# Patient Record
Sex: FEMALE | Race: WHITE | Employment: OTHER | URBAN - METROPOLITAN AREA
[De-identification: names, ages, dates, MRNs, and addresses within clinical notes are randomized per-mention and may not be internally consistent; named-entity substitution may affect disease eponyms.]

---

## 2019-10-30 ENCOUNTER — THERAPY VISIT (OUTPATIENT)
Dept: PHYSICAL THERAPY | Facility: CLINIC | Age: 79
End: 2019-10-30
Payer: COMMERCIAL

## 2019-10-30 DIAGNOSIS — M54.2 NECK PAIN: ICD-10-CM

## 2019-10-30 PROCEDURE — 97161 PT EVAL LOW COMPLEX 20 MIN: CPT | Mod: GP | Performed by: PHYSICAL THERAPIST

## 2019-10-30 PROCEDURE — 97110 THERAPEUTIC EXERCISES: CPT | Mod: GP | Performed by: PHYSICAL THERAPIST

## 2019-10-30 PROCEDURE — 97140 MANUAL THERAPY 1/> REGIONS: CPT | Mod: GP | Performed by: PHYSICAL THERAPIST

## 2019-10-30 NOTE — PROGRESS NOTES
[facility] Initial Evaluation  Subjective:  The history is provided by the patient. No  was used.   Type of problem:  Cervical spine   Condition occurred with:  Insidious onset. This is a chronic condition   Problem details: On or about 7/1/2019, I started to have neck pain.  When I saw the MD she took xray and stated I have degenerative disc disease. .   Patient reports pain:  Cervical left side, cervical right side, lower cervical spine and mid cervical spine (right side more than left). Radiates to: none. Associated symptoms:  Headache (every monring wake up.  chews gum, cataract remove.). Symptoms are exacerbated by looking up or down and rotating head (reading ) and relieved by heat (medication RA, tylenol).    Libra Courtney being seen for neck pain.   Problem began 10/4/2019. Where condition occurred: for unknown reasons.Problem occurred: degenerative disc disease  and reported as 2/10 (goes up to 4/10, morning 5-6/10) on pain scale. General health as reported by patient is fair. Pertinent medical history includes:  Rheumatoid arthritis, implanted device and osteoarthritis. Other medical history details: narcolepsy, left TKA, hypoactive thyroid.   Other medical allergies details: none.   Other surgery history details: Left TKA.  Current medications:  Anti-inflammatory, pain medication and thyroid medication. Other medications details: methylphenidate, levothyroxine.   Primary job tasks include:  Computer work and prolonged sitting.  Pain is described as aching and is constant. Pain is worse in the A.M.. Since onset symptoms are gradually worsening. Special tests:  X-ray (degenerative disc disease). Previous treatment includes physical therapy (left TKA).    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  fracture his back and had to work his brace on , walking a dog.  Red flags:   None as reported by patient.                      Objective:  Standing Alignment:    Cervical/Thoracic:  Forward head and thoracic kyphosis increased  Shoulder/UE:  Rounded shoulders, scapular abduction L and scapular abduction R  Lumbar:  Lordosis decr          General deviations alignment: wide base of support.      Flexibility/Screens:   Positive screens:  Cervical and Thoracic  Upper Extremity:    Decreased left upper extremity flexibility at:  Pectoralis Major; Pectoralis Minor and Latissimus    Decreased right upper extremity flexibility present at:  Pectoralis Major; Pectoralis Minor and Latissimus    Spine:  Decreased left spine flexibility:  Scalenes; Upper Trap and Levator    Decreased right spine flexibility:  Scalenes; Upper Trap and Levator                  Cervical/Thoracic Evaluation    AROM:  AROM Cervical:    Flexion:          Moderate movement  Extension:       Minimal movement  Rotation:         Left: minimal movement     Right: minimal movement  Side Bend:      Left:     Right:   AROM Thoracic:    Flexion:              Extension:           Rotation:            Left: minimal movement     Right: minimal movement    Strength: shoulder flexion, abduction WFL   Headaches: cervical  Cervical Myotomes:    C1-2 (Neck Flex): Left:  5    Right: 5  C3 (neck side bend): Left: 5    Right: 5  C4 (shrug):  Left: 5    Right: 5  C5 (Deltoid):  Left: 5-    Right: 5-  C6 (Biceps):  Left: 5    Right: 5  C7 (Triceps):  Left: 5    Right: 5  C8 (Thumb Ext): Left: 5    Right: 5  T1 (Intrinsics): Left: 5    Right: 5        Cervical Palpation:    Tenderness present at Left:    Scalenes; Upper Trap; Levator; Facet and Suboccipitals  Tenderness present at Right:    Scalenes; Upper Trap; Levator; Facet and Suboccipitals    Cervical Stability/Joint Clearing:    Left positive at:1st Rib  Right positive at:  1st Rib  Spinal Segmental Conclusions:    Level:  Hypo at T1, T2, T3, T4 and T5                                                 General     ROS    Assessment/Plan:    Patient is a 79 year old female with cervical complaints.    Patient has the following significant findings with corresponding treatment plan.                Diagnosis 1:  Cervical pain   Pain -  manual therapy, self management, education, directional preference exercise and home program  Decreased ROM/flexibility - manual therapy, therapeutic exercise, therapeutic activity and home program  Decreased joint mobility - manual therapy, therapeutic exercise, therapeutic activity and home program  Decreased strength - therapeutic exercise, therapeutic activities and home program  Impaired muscle performance - neuro re-education and home program  Decreased function - therapeutic activities and home program  Impaired posture - neuro re-education, therapeutic activities and home program    Therapy Evaluation Codes:   Cumulative Therapy Evaluation is: Low complexity.    Previous and current functional limitations:  (See Goal Flow Sheet for this information)    Short term and Long term goals: (See Goal Flow Sheet for this information)     Communication ability:  Patient appears to be able to clearly communicate and understand verbal and written communication and follow directions correctly.  Treatment Explanation - The following has been discussed with the patient:   RX ordered/plan of care  This patient would benefit from PT intervention to resume normal activities.   Rehab potential is good.    Frequency:  1 X week, once daily  Duration:  for 6 weeks  Discharge Plan:  Achieve all LTG.  Independent in home treatment program.    Please refer to the daily flowsheet for treatment today, total treatment time and time spent performing 1:1 timed codes.

## 2019-11-06 ENCOUNTER — THERAPY VISIT (OUTPATIENT)
Dept: PHYSICAL THERAPY | Facility: CLINIC | Age: 79
End: 2019-11-06
Payer: COMMERCIAL

## 2019-11-06 DIAGNOSIS — M54.2 NECK PAIN: ICD-10-CM

## 2019-11-06 PROCEDURE — 97140 MANUAL THERAPY 1/> REGIONS: CPT | Mod: GP | Performed by: PHYSICAL THERAPIST

## 2019-11-06 PROCEDURE — 97110 THERAPEUTIC EXERCISES: CPT | Mod: GP | Performed by: PHYSICAL THERAPIST

## 2019-11-06 PROCEDURE — 97112 NEUROMUSCULAR REEDUCATION: CPT | Mod: GP | Performed by: PHYSICAL THERAPIST

## 2019-11-13 ENCOUNTER — THERAPY VISIT (OUTPATIENT)
Dept: PHYSICAL THERAPY | Facility: CLINIC | Age: 79
End: 2019-11-13
Payer: COMMERCIAL

## 2019-11-13 DIAGNOSIS — M54.2 NECK PAIN: ICD-10-CM

## 2019-11-13 PROCEDURE — 97110 THERAPEUTIC EXERCISES: CPT | Mod: GP | Performed by: PHYSICAL THERAPIST

## 2019-11-13 PROCEDURE — 97140 MANUAL THERAPY 1/> REGIONS: CPT | Mod: GP | Performed by: PHYSICAL THERAPIST

## 2019-11-13 PROCEDURE — 97112 NEUROMUSCULAR REEDUCATION: CPT | Mod: GP | Performed by: PHYSICAL THERAPIST

## 2019-11-20 ENCOUNTER — THERAPY VISIT (OUTPATIENT)
Dept: PHYSICAL THERAPY | Facility: CLINIC | Age: 79
End: 2019-11-20
Payer: COMMERCIAL

## 2019-11-20 DIAGNOSIS — M54.2 NECK PAIN: ICD-10-CM

## 2019-11-20 PROCEDURE — 97140 MANUAL THERAPY 1/> REGIONS: CPT | Mod: GP | Performed by: PHYSICAL THERAPIST

## 2019-11-20 PROCEDURE — 97112 NEUROMUSCULAR REEDUCATION: CPT | Mod: GP | Performed by: PHYSICAL THERAPIST

## 2019-11-20 PROCEDURE — 97110 THERAPEUTIC EXERCISES: CPT | Mod: GP | Performed by: PHYSICAL THERAPIST

## 2019-11-20 NOTE — LETTER
[facility] PHYSICAL THERAPY  [address]  [place] [address]  [phone]    2021    Re: Libra Courtney   :   1940  MRN:  [id number]   REFERRING PHYSICIAN:   Fiona Hedrick    [facility] PHYSICAL THERAPY    Date of Initial Evaluation:  10/23/19  Visits:  Rxs Used: 4  Reason for Referral:  Neck pain    EVALUATION SUMMARY    Discharge Note  Progress reporting period is from initial evaluation date (please see noted date below) to 2019.  Linked Episodes   Type: Episode: Status: Noted: Resolved: Last update: Updated by:   PHYSICAL THERAPY neck pain 10/30/2019 Active 10/30/2019  2019  2:13 PM Kymberly Jorge PT      Comments:       Libra failed to follow up and current status is unknown.  Please see information below for last relevant information on current status.  Patient seen for 4 visits.    SUBJECTIVE  Subjective changes noted by patient:  there are time periods where there is no pain.  I have more movemenet.    .  Current pain level is 2/10.     Previous pain level was  5/10.   Changes in function:  Yes (See Goal flowsheet attached for changes in current functional level)  Adverse reaction to treatment or activity: None    OBJECTIVE  Changes noted in objective findings: TROM rotation bilateral movement  more aware of head position. CROM flexion 75% of motion, extension minimal movement, rotation right minimal , left minimal to moderate.  tightness in UT, levator scalenes    ASSESSMENT/PLAN  Diagnosis: neck pain   Updated problem list and treatment plan:   Pain - HEP  Decreased ROM/flexibility - HEP  Impaired muscle performance - HEP  Re: Libra Courtney   :   1940  STG/LTGs have been met or progress has been made towards goals:  Yes, please see goal flowsheet for most current information  Assessment of Progress: current status is unknown.    Last current  status:     Self Management Plans:  HEP  I have re-evaluated this patient and find that the nature, scope, duration and intensity of the therapy is appropriate for the medical condition of the patient.  Libra continues to require the following intervention to meet STG and LTG's:  HEP.    Recommendations:  Discharge with current home program.  Patient to follow up with MD as needed.      Thank you for your referral.    INQUIRIES  Therapist: Kymberly Booker   [facility] [place] PH[facility]ICAL THERAPY  [address] [address]  Phone: [phone]  Fax: [phone]

## 2021-01-07 PROBLEM — M54.2 NECK PAIN: Status: RESOLVED | Noted: 2019-10-30 | Resolved: 2021-01-07

## 2021-01-07 NOTE — PROGRESS NOTES
Discharge Note    Progress reporting period is from initial evaluation date (please see noted date below) to Nov 20, 2019.  Linked Episodes   Type: Episode: Status: Noted: Resolved: Last update: Updated by:   PHYSICAL THERAPY neck pain 10/30/2019 Active 10/30/2019  11/20/2019  2:13 PM Kymberly Jorge, HERMAN      Comments:       Libra failed to follow up and current status is unknown.  Please see information below for last relevant information on current status.  Patient seen for 4 visits.    SUBJECTIVE  Subjective changes noted by patient:  there are time periods where there is no pain.  I have more movemenet.    .  Current pain level is 2/10.     Previous pain level was  5/10.   Changes in function:  Yes (See Goal flowsheet attached for changes in current functional level)  Adverse reaction to treatment or activity: None    OBJECTIVE  Changes noted in objective findings: TROM rotation bilateral movement  more aware of head position. CROM flexion 75% of motion, extension minimal movement, rotation right minimal , left minimal to moderate.  tightness in UT, levator scalenes    ASSESSMENT/PLAN  Diagnosis: neck pain   Updated problem list and treatment plan:   Pain - HEP  Decreased ROM/flexibility - HEP  Impaired muscle performance - HEP  STG/LTGs have been met or progress has been made towards goals:  Yes, please see goal flowsheet for most current information  Assessment of Progress: current status is unknown.    Last current status:     Self Management Plans:  HEP  I have re-evaluated this patient and find that the nature, scope, duration and intensity of the therapy is appropriate for the medical condition of the patient.  Libra continues to require the following intervention to meet STG and LTG's:  HEP.    Recommendations:  Discharge with current home program.  Patient to follow up with MD as needed.    Please refer to the daily flowsheet for treatment today, total treatment time and time spent performing 1:1  timed codes.

## 2022-06-30 NOTE — LETTER
[facility]  [address]  [place] [address]  [phone]    2019    Re: Libra Courtney   :   1940  MRN:  [id number]   REFERRING PHYSICIAN:   Fiona Hedrick    [facility]    Date of Initial Evaluation:  10/30/2019  Visits:  Rxs Used: 1  Reason for Referral:  Neck pain    EVALUATION SUMMARY    Subjective:  The history is provided by the patient. No  was used.   Type of problem:  Cervical spine  Condition occurred with:  Insidious onset. This is a chronic condition   Problem details: On or about 2019, I started to have neck pain.  When I saw the MD she took xray and stated I have degenerative disc disease. .   Patient reports pain:  Cervical left side, cervical right side, lower cervical spine and mid cervical spine (right side more than left). Radiates to: none. Associated symptoms:  Headache (every monring wake up.  chews gum, cataract remove.). Symptoms are exacerbated by looking up or down and rotating head (reading ) and relieved by heat (medication RA, tylenol).  Libra Courtney being seen for neck pain.   Problem began 10/4/2019. Where condition occurred: for unknown reasons.Problem occurred: degenerative disc disease  and reported as 2/10 (goes up to 4/10, morning 5-6/10) on pain scale. General health as reported by patient is fair. Pertinent medical history includes:  Rheumatoid arthritis, implanted device and osteoarthritis. Other medical history details: narcolepsy, left TKA, hypoactive thyroid.   Other medical allergies details: none.   Other surgery history details: Left TKA.  Current medications:  Anti-inflammatory, pain medication and thyroid medication. Other medications details: methylphenidate, levothyroxine.   Primary job tasks include:  Computer work and prolonged sitting.  Pain is described as aching and is  constant. Pain is worse in the A.M.. Since onset symptoms are gradually worsening. Special tests:  X-ray (degenerative disc disease). Previous treatment includes physical therapy (left TKA).    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  fracture his back and had to work his brace on , walking a dog.  Red flags:  None as reported by patient.                 Objective:  Standing Alignment:    Cervical/Thoracic:  Forward head and thoracic kyphosis increased  Shoulder/UE:  Rounded shoulders, scapular abduction L and scapular abduction R  Lumbar:  Lordosis decr  General deviations alignment: wide base of support.  Re: Libra Courtney   :   1940    Flexibility/Screens:   Positive screens:  Cervical and Thoracic  Upper Extremity:    Decreased left upper extremity flexibility at:  Pectoralis Major; Pectoralis Minor and Latissimus  Decreased right upper extremity flexibility present at:  Pectoralis Major; Pectoralis Minor and Latissimus  Spine:  Decreased left spine flexibility:  Scalenes; Upper Trap and Levator  Decreased right spine flexibility:  Scalenes; Upper Trap and Levator         Cervical/Thoracic Evaluation  AROM:  AROM Cervical  Flexion:          Moderate movement  Extension:       Minimal movement  Rotation:         Left: minimal movement     Right: minimal movement  Side Bend:      Left:     Right:   AROM Thoracic:  Flexion:              Extension:           Rotation:            Left: minimal movement     Right: minimal movement    Strength: shoulder flexion, abduction WFL   Headaches: cervical  Cervical Myotomes:    C1-2 (Neck Flex): Left:  5    Right: 5  C3 (neck side bend): Left: 5    Right: 5  C4 (shrug):  Left: 5    Right: 5  C5 (Deltoid):  Left: 5-    Right: 5-  C6 (Biceps):  Left: 5    Right: 5  C7 (Triceps):  Left: 5    Right: 5  C8 (Thumb Ext): Left: 5    Right: 5  T1 (Intrinsics): Left: 5    Right:  5  Cervical Palpation:    Tenderness present at Left:    Scalenes; Upper Trap; Levator; Facet and Suboccipitals  Tenderness present at Right:    Scalenes; Upper Trap; Levator; Facet and Suboccipitals  Cervical Stability/Joint Clearing:    Left positive at:1st Rib  Right positive at:  1st Rib  Spinal Segmental Conclusions:    Level:  Hypo at T1, T2, T3, T4 and T5      Assessment/Plan:    Patient is a 79 year old female with cervical complaints.    Patient has the following significant findings with corresponding treatment plan.                Diagnosis 1:  Cervical pain   Pain -  manual therapy, self management, education, directional preference exercise and home program  Decreased ROM/flexibility - manual therapy, therapeutic exercise, therapeutic activity and home program  Decreased joint mobility - manual therapy, therapeutic exercise, therapeutic activity and home program  Decreased strength - therapeutic exercise, therapeutic activities and home program  Impaired muscle performance - neuro re-education and home program  Decreased function - therapeutic activities and home program  Impaired posture - neuro re-education, therapeutic activities and home program    Therapy Evaluation Codes:   Cumulative Therapy Evaluation is: Low complexity.    Previous and current functional limitations:  (See Goal Flow Sheet for this information)    Short term and Long term goals: (See Goal Flow Sheet for this information)     Communication ability:  Patient appears to be able to clearly communicate and understand verbal and written communication and follow directions correctly.  Treatment Explanation - The following has been discussed with the patient:   RX ordered/plan of care  This patient would benefit from PT intervention to resume normal activities.   Rehab potential is good.    Frequency:  1 X week, once daily  Duration:  for 6 weeks  Discharge Plan:  Achieve all LTG.  Independent in home treatment program.    Thank you for  Sudden numbness or weakness of the face, arm, or leg, especially on one side of the body. Confusion, trouble speaking or understanding. Trouble seeing in one or both eyes. Trouble walking, dizziness, loss of balance or coordination. Severe headache. your referral.    INQUIRIES  Therapist: Kymberly King PT  [facility] [place] [facility]ICAL [facility]  [address] [address]  Phone: [phone]  Fax: [phone]